# Patient Record
Sex: MALE | Race: WHITE | NOT HISPANIC OR LATINO
[De-identification: names, ages, dates, MRNs, and addresses within clinical notes are randomized per-mention and may not be internally consistent; named-entity substitution may affect disease eponyms.]

---

## 2022-04-04 ENCOUNTER — APPOINTMENT (OUTPATIENT)
Dept: OPHTHALMOLOGY | Facility: CLINIC | Age: 22
End: 2022-04-04

## 2022-09-30 ENCOUNTER — APPOINTMENT (OUTPATIENT)
Dept: PHYSICAL MEDICINE AND REHAB | Facility: CLINIC | Age: 22
End: 2022-09-30

## 2022-09-30 VITALS
RESPIRATION RATE: 16 BRPM | HEART RATE: 99 BPM | OXYGEN SATURATION: 100 % | WEIGHT: 161 LBS | DIASTOLIC BLOOD PRESSURE: 88 MMHG | BODY MASS INDEX: 27.49 KG/M2 | SYSTOLIC BLOOD PRESSURE: 132 MMHG | HEIGHT: 64 IN | TEMPERATURE: 97.2 F

## 2022-09-30 DIAGNOSIS — T50.905A ABNORMAL WEIGHT GAIN: ICD-10-CM

## 2022-09-30 DIAGNOSIS — M62.89 OTHER SPECIFIED DISORDERS OF MUSCLE: ICD-10-CM

## 2022-09-30 DIAGNOSIS — G47.00 INSOMNIA, UNSPECIFIED: ICD-10-CM

## 2022-09-30 DIAGNOSIS — Z73.819 BEHAVIORAL INSOMNIA OF CHILDHOOD, UNSPECIFIED TYPE: ICD-10-CM

## 2022-09-30 DIAGNOSIS — Z78.9 OTHER SPECIFIED HEALTH STATUS: ICD-10-CM

## 2022-09-30 DIAGNOSIS — R63.5 ABNORMAL WEIGHT GAIN: ICD-10-CM

## 2022-09-30 PROCEDURE — 99214 OFFICE O/P EST MOD 30 MIN: CPT

## 2022-09-30 RX ORDER — BUPROPION HYDROCHLORIDE 150 MG/1
150 TABLET, EXTENDED RELEASE ORAL EVERY MORNING
Qty: 1 | Refills: 0 | Status: ACTIVE | COMMUNITY
Start: 2022-09-30

## 2022-09-30 RX ORDER — CLONIDINE HYDROCHLORIDE 0.1 MG/1
0.1 TABLET ORAL DAILY
Qty: 1 | Refills: 0 | Status: ACTIVE | COMMUNITY
Start: 2022-09-30

## 2022-09-30 RX ORDER — METFORMIN HYDROCHLORIDE 500 MG/1
500 TABLET, COATED ORAL
Refills: 0 | Status: ACTIVE | COMMUNITY

## 2022-09-30 RX ORDER — MIRTAZAPINE 7.5 MG/1
7.5 TABLET, FILM COATED ORAL
Qty: 1 | Refills: 0 | Status: ACTIVE | COMMUNITY
Start: 2022-09-30

## 2022-09-30 RX ORDER — MINOCYCLINE HYDROCHLORIDE 100 MG/1
100 TABLET ORAL
Refills: 0 | Status: ACTIVE | COMMUNITY

## 2022-09-30 RX ORDER — HYDROXYZINE HYDROCHLORIDE 50 MG/1
50 TABLET ORAL EVERY 8 HOURS
Qty: 60 | Refills: 0 | Status: ACTIVE | COMMUNITY

## 2022-09-30 RX ORDER — CLONIDINE 0.2 MG/D
0.2 PATCH TRANSDERMAL
Refills: 0 | Status: DISCONTINUED | COMMUNITY
End: 2022-09-30

## 2022-09-30 RX ORDER — ARIPIPRAZOLE 10 MG/1
10 TABLET ORAL DAILY
Refills: 0 | Status: ACTIVE | COMMUNITY

## 2022-09-30 RX ORDER — MIRTAZAPINE 15 MG/1
15 TABLET, FILM COATED ORAL
Qty: 90 | Refills: 0 | Status: ACTIVE | COMMUNITY

## 2022-09-30 RX ORDER — ESCITALOPRAM OXALATE 20 MG/1
20 TABLET ORAL DAILY
Qty: 1 | Refills: 0 | Status: ACTIVE | COMMUNITY
Start: 2022-09-30

## 2022-09-30 RX ORDER — LORAZEPAM 1 MG/1
1 TABLET ORAL
Refills: 0 | Status: ACTIVE | COMMUNITY

## 2022-09-30 RX ORDER — BUPROPION HYDROCHLORIDE 150 MG/1
150 TABLET, FILM COATED, EXTENDED RELEASE ORAL
Qty: 1 | Refills: 0 | Status: ACTIVE | COMMUNITY
Start: 2022-09-30

## 2022-09-30 RX ORDER — OMEPRAZOLE 40 MG/1
40 CAPSULE, DELAYED RELEASE ORAL
Qty: 90 | Refills: 0 | Status: DISCONTINUED | COMMUNITY
End: 2022-09-30

## 2022-09-30 RX ORDER — ESZOPICLONE 2 MG/1
2 TABLET, FILM COATED ORAL
Qty: 1 | Refills: 0 | Status: ACTIVE | COMMUNITY
Start: 2022-09-30

## 2022-09-30 RX ORDER — NEBIVOLOL 10 MG/1
10 TABLET ORAL DAILY
Qty: 1 | Refills: 0 | Status: ACTIVE | COMMUNITY
Start: 2022-09-30

## 2022-09-30 NOTE — HISTORY OF PRESENT ILLNESS
[FreeTextEntry8] : CC- cough for 1 week\par \par Patient presents today accompanied by his mother who assisted with history and HPI. He complaints of a productive cough that has been worsening for the past week. His mother notes it sounds like a croupy cough. He notes the symptoms are worse at night and that he is coughing in his sleep. He has an additional complaint a itching and redness in his pelvic region for the past week which he has been using Antigungal spray with some improvement. Due to his OCD he is only able to shower once per week. He denies any HA, fever, chills, N/V/D, abdominal pain, ear pain, sore throat, wheezing, cp, or sob.  Mother notes she has a sore throat but no other sick contacts. He tested for Covid two days ago which was negative. He has not been taking anything for the cough.

## 2022-09-30 NOTE — PHYSICAL EXAM
[No Acute Distress] : no acute distress [Well Nourished] : well nourished [Well Developed] : well developed [Well-Appearing] : well-appearing [Normal Sclera/Conjunctiva] : normal sclera/conjunctiva [PERRL] : pupils equal round and reactive to light [Normal Outer Ear/Nose] : the outer ears and nose were normal in appearance [Normal Nasal Mucosa] : the nasal mucosa was normal [No Respiratory Distress] : no respiratory distress  [No Accessory Muscle Use] : no accessory muscle use [Clear to Auscultation] : lungs were clear to auscultation bilaterally [Normal Rate] : normal rate  [Regular Rhythm] : with a regular rhythm [Normal S1, S2] : normal S1 and S2 [No Murmur] : no murmur heard [Urethral Meatus] : meatus normal [Penis Abnormality] : normal uncircumcised penis [Scrotum] : the scrotum was normal [Normal Anterior Cervical Nodes] : no anterior cervical lymphadenopathy [Speech Grossly Normal] : speech grossly normal [Memory Grossly Normal] : memory grossly normal [Normal Affect] : the affect was normal [Normal Insight/Judgement] : insight and judgment were intact [de-identified] : B/L Cerumen impaction, erythematous oropharynx  [FreeTextEntry1] : Mild erythema noted in b/l inner pelvic crease as well as underneath his penis and anterior aspect of his scrotum. No lesions noted and skin intact.

## 2022-09-30 NOTE — PLAN
[FreeTextEntry1] : Lungs CTA no wheezing noted. Will treat sx for excessive mucous with Mucinex DM. Discussed need for urgent f/u if sx worsen. If symptoms persist will obtain Cxray. Given night time coughing there is a concern for asthma, eliminate any possible allergens and therapy dog to be bathed regularly and not sleep in bed with patient if unnecessary. \par \par Patient unable to shower and will d/c use of OTC spray and start on Desenex powder to help keep area dry.\par \par Patient to continue with present medications.  \par Increase fluid intake.	 \par RTO in 7-10 days for re-eval or sooner if sx worsen. \par \par 30 minutes was spent with patient reviewing findings/results during this visit. Ample time was provided to answer any questions or address concerns to the patients satisfaction.. \par \par

## 2022-10-12 ENCOUNTER — APPOINTMENT (OUTPATIENT)
Dept: PHYSICAL MEDICINE AND REHAB | Facility: CLINIC | Age: 22
End: 2022-10-12

## 2022-10-12 VITALS
BODY MASS INDEX: 27.49 KG/M2 | SYSTOLIC BLOOD PRESSURE: 124 MMHG | DIASTOLIC BLOOD PRESSURE: 80 MMHG | WEIGHT: 161 LBS | RESPIRATION RATE: 16 BRPM | HEIGHT: 64 IN | HEART RATE: 124 BPM | OXYGEN SATURATION: 98 % | TEMPERATURE: 97.8 F

## 2022-10-12 DIAGNOSIS — J06.9 ACUTE UPPER RESPIRATORY INFECTION, UNSPECIFIED: ICD-10-CM

## 2022-10-12 DIAGNOSIS — R05.1 ACUTE COUGH: ICD-10-CM

## 2022-10-12 PROCEDURE — 99213 OFFICE O/P EST LOW 20 MIN: CPT

## 2022-10-12 RX ORDER — CETIRIZINE HYDROCHLORIDE 10 MG/1
10 TABLET, FILM COATED ORAL DAILY
Qty: 30 | Refills: 0 | Status: ACTIVE | COMMUNITY
Start: 2022-10-12 | End: 1900-01-01

## 2022-10-12 RX ORDER — FLUTICASONE PROPIONATE 50 UG/1
50 SPRAY, METERED NASAL TWICE DAILY
Qty: 1 | Refills: 0 | Status: ACTIVE | COMMUNITY
Start: 2022-10-12 | End: 1900-01-01

## 2022-10-12 RX ORDER — DEXTROAMPHETAMINE SULFATE, DEXTROAMPHETAMINE SACCHARATE, AMPHETAMINE SULFATE AND AMPHETAMINE ASPARTATE 5; 5; 5; 5 MG/1; MG/1; MG/1; MG/1
20 CAPSULE, EXTENDED RELEASE ORAL DAILY
Refills: 0 | Status: DISCONTINUED | COMMUNITY
End: 2022-10-12

## 2022-10-13 PROBLEM — J06.9 URI, ACUTE: Status: RESOLVED | Noted: 2022-10-13 | Resolved: 2022-11-12

## 2022-10-13 NOTE — ADDENDUM
[FreeTextEntry1] : 10-: \par Spoke with patient and MOC. Patient now has fever 101 and purulent productive cough. Denies any sob, weakness, cp, and is tolerating po intake. Patient unable to obtain Cxray at this time. Will tx with Zpack and patient to f/u in office next week.

## 2022-10-13 NOTE — PHYSICAL EXAM
[No Acute Distress] : no acute distress [Well Nourished] : well nourished [Well Developed] : well developed [Well-Appearing] : well-appearing [Normal Sclera/Conjunctiva] : normal sclera/conjunctiva [PERRL] : pupils equal round and reactive to light [No Respiratory Distress] : no respiratory distress  [No Accessory Muscle Use] : no accessory muscle use [Regular Rhythm] : with a regular rhythm [Normal S1, S2] : normal S1 and S2 [No Murmur] : no murmur heard [Speech Grossly Normal] : speech grossly normal [Memory Grossly Normal] : memory grossly normal [Normal Mood] : the mood was normal [Normal Insight/Judgement] : insight and judgment were intact [de-identified] : B/L cerumen impaction, unable to assess. Cobblestoning posterior oropharynx and erythamtous, normal tonsils, no exudate noted. Turbinates erythatmous and boggy with clear d/c noted.   [de-identified] : mild anterior cervical MARKELL [de-identified] : Tachycardic at baseline

## 2022-10-13 NOTE — PLAN
[FreeTextEntry1] : Start on Flonase and Zyrtex as prescribed for PND\par Patient to continue with present medications.  \par Increase fluid intake.	 \par RTO in 7-10 days for re-eval or sooner if sx worsen. \par \par 30 minutes was spent with patient reviewing findings/results during this visit. Ample time was provided to answer any questions or address concerns to the patients satisfaction.. \par \par

## 2022-10-13 NOTE — HISTORY OF PRESENT ILLNESS
[FreeTextEntry8] : CC- Cough for the past two weeks\par \par Patient presents with rhinorrhea and cough that has been persistent for the past two weeks and notes it to be slightly worse per patients mom. He notes the cough to be wet and unable to bring up sputum. He is unable to assess if he is having a fever or not due to his OCD and he denies any other complaints at this time to include cp, sob, weakness, n/v/d, and is tolerating po intake. Denies any sick contacts. He is only using mucinex at this time.

## 2022-10-21 ENCOUNTER — APPOINTMENT (OUTPATIENT)
Dept: PHYSICAL MEDICINE AND REHAB | Facility: CLINIC | Age: 22
End: 2022-10-21

## 2022-10-26 ENCOUNTER — APPOINTMENT (OUTPATIENT)
Dept: PHYSICAL MEDICINE AND REHAB | Facility: CLINIC | Age: 22
End: 2022-10-26

## 2022-10-26 VITALS
TEMPERATURE: 97.1 F | WEIGHT: 161 LBS | RESPIRATION RATE: 16 BRPM | SYSTOLIC BLOOD PRESSURE: 118 MMHG | OXYGEN SATURATION: 100 % | DIASTOLIC BLOOD PRESSURE: 82 MMHG | HEIGHT: 64 IN | HEART RATE: 115 BPM | BODY MASS INDEX: 27.49 KG/M2

## 2022-10-26 DIAGNOSIS — B35.6 TINEA CRURIS: ICD-10-CM

## 2022-10-26 DIAGNOSIS — R61 GENERALIZED HYPERHIDROSIS: ICD-10-CM

## 2022-10-26 PROCEDURE — 99214 OFFICE O/P EST MOD 30 MIN: CPT

## 2022-10-26 RX ORDER — BUPROPION HYDROCHLORIDE 300 MG/1
300 TABLET, EXTENDED RELEASE ORAL
Qty: 30 | Refills: 0 | Status: ACTIVE | COMMUNITY
Start: 2022-10-22

## 2022-10-26 RX ORDER — SODIUM CHLORIDE FOR INHALATION 0.9 %
0.9 VIAL, NEBULIZER (ML) INHALATION
Qty: 90 | Refills: 0 | Status: ACTIVE | COMMUNITY
Start: 2022-10-21

## 2022-10-26 NOTE — PHYSICAL EXAM
[No Acute Distress] : no acute distress [Well Nourished] : well nourished [Well Developed] : well developed [Well-Appearing] : well-appearing [No Respiratory Distress] : no respiratory distress  [No Accessory Muscle Use] : no accessory muscle use [Clear to Auscultation] : lungs were clear to auscultation bilaterally [Normal Rate] : normal rate  [Regular Rhythm] : with a regular rhythm [Normal S1, S2] : normal S1 and S2 [No Murmur] : no murmur heard [Speech Grossly Normal] : speech grossly normal [Memory Grossly Normal] : memory grossly normal [Normal Mood] : the mood was normal [Normal Insight/Judgement] : insight and judgment were intact [de-identified] : Erythematous rash in groin crease with no d/c or plaque noted. 1 mm pustule noted with hair on scrotum where patient complained of lump. Non tender. No other lumps or masses noted. Area appears non infected. Axillary region and forehead with diaphoresis noted.  [de-identified] : Flat affect

## 2022-10-26 NOTE — HISTORY OF PRESENT ILLNESS
[FreeTextEntry8] : CC- lump on penis and itchING\par \par Patient presents today with complaints of lump on the underside of his penis toward his scotum that was noticed a few days ago with continued itchy. Denies any redness or d/c. He has been using an alternate powder than the miconazole as recommended and patient notes it was not helping. He also complains of frequent diaphoresis without known cause for the past few months. He notes awaking in sweat. His recent labs returned with normal TSH and normal CBC. He has an appt with rheumatology to be evaluated for elevated RACQUEL. He denies any bone pain, weakness, sob, cp, fevers, chills, n/v/d, back pain, numbness/tingling. \par \par \par He provided a copy of his labs from his psychiatrist who was checking his labs to r/o other cause of fatigue beyond medications and his labs show elevated RACQUEL and significant hypertriglyceridemia in addition to elevated LDL. He continues to have fatigue after d/c his stimulants and using CPAP.

## 2022-10-26 NOTE — PLAN
[FreeTextEntry1] : Reviewed labs from [facility] \par \par Hypertriglyceridemia \par -Triglycerides 296 and \par -ALT 61\par - Start on fenofibrate once daily and reassess in 1 month to recheck Lipid and LFT's \par \par Elevated RACQUEL\par -1:320\par -CRP 9.7\par - F/U with rheumatology \par \par Hyperhidrosis \par - Unable to start Aluminum chloride powder as patients brother has issues with aluminum and mother prefers alternative medication\par - F/U with Derm evaluation and treatment \par \par Tinea Cruris\par - Keep area clean and dry\par - Start on Miconazole powder for 4-6 weeks \par -F/U with Derm if no improvement. \par \par Labs Drawn and sent to [facility]. \par Patient to continue with present medications.  \par Increase fluid intake.	 \par RTO in 7-10 days for re-eval or sooner if sx worsen. \par \par 30 minutes was spent with patient reviewing findings/results during this visit. Ample time was provided to answer any questions or address concerns to the patients satisfaction.. \par \par \par \par

## 2022-10-27 ENCOUNTER — FORM ENCOUNTER (OUTPATIENT)
Age: 22
End: 2022-10-27

## 2022-11-01 RX ORDER — MICONAZOLE NITRATE
2 POWDER (GRAM) MISCELLANEOUS
Qty: 60 | Refills: 0 | Status: ACTIVE | COMMUNITY
Start: 2022-11-01 | End: 1900-01-01

## 2022-11-01 RX ORDER — AZITHROMYCIN 250 MG/1
250 TABLET, FILM COATED ORAL
Qty: 1 | Refills: 0 | Status: COMPLETED | COMMUNITY
Start: 2022-10-13 | End: 2022-11-01

## 2022-11-04 RX ORDER — ANTI-FUNGAL POWDER MICONAZOLE NITRATE TALC FREE 1.42 G/71G
2 POWDER TOPICAL TWICE DAILY
Qty: 1 | Refills: 0 | Status: DISCONTINUED | COMMUNITY
Start: 2022-10-26 | End: 2022-11-04

## 2022-11-16 ENCOUNTER — FORM ENCOUNTER (OUTPATIENT)
Age: 22
End: 2022-11-16

## 2022-12-08 ENCOUNTER — APPOINTMENT (OUTPATIENT)
Dept: PHYSICAL MEDICINE AND REHAB | Facility: CLINIC | Age: 22
End: 2022-12-08

## 2023-01-01 ENCOUNTER — FORM ENCOUNTER (OUTPATIENT)
Age: 23
End: 2023-01-01

## 2023-01-18 ENCOUNTER — RX RENEWAL (OUTPATIENT)
Age: 23
End: 2023-01-18

## 2023-01-18 ENCOUNTER — NON-APPOINTMENT (OUTPATIENT)
Age: 23
End: 2023-01-18

## 2023-01-31 ENCOUNTER — APPOINTMENT (OUTPATIENT)
Dept: PHYSICAL MEDICINE AND REHAB | Facility: CLINIC | Age: 23
End: 2023-01-31

## 2023-03-22 RX ORDER — LANSOPRAZOLE 30 MG/1
30 TABLET, ORALLY DISINTEGRATING ORAL
Qty: 1 | Refills: 0 | Status: DISCONTINUED | COMMUNITY
Start: 2022-09-30 | End: 2023-03-22

## 2023-03-28 ENCOUNTER — FORM ENCOUNTER (OUTPATIENT)
Age: 23
End: 2023-03-28

## 2023-07-19 ENCOUNTER — OFFICE (OUTPATIENT)
Dept: URBAN - METROPOLITAN AREA CLINIC 115 | Facility: CLINIC | Age: 23
Setting detail: OPHTHALMOLOGY
End: 2023-07-19
Payer: COMMERCIAL

## 2023-07-19 DIAGNOSIS — H16.223: ICD-10-CM

## 2023-07-19 DIAGNOSIS — H16.221: ICD-10-CM

## 2023-07-19 DIAGNOSIS — H53.9: ICD-10-CM

## 2023-07-19 DIAGNOSIS — H16.222: ICD-10-CM

## 2023-07-19 DIAGNOSIS — H43.811: ICD-10-CM

## 2023-07-19 DIAGNOSIS — F95.9: ICD-10-CM

## 2023-07-19 DIAGNOSIS — H47.333: ICD-10-CM

## 2023-07-19 DIAGNOSIS — H53.433: ICD-10-CM

## 2023-07-19 PROCEDURE — 92133 CPTRZD OPH DX IMG PST SGM ON: CPT | Performed by: OPHTHALMOLOGY

## 2023-07-19 PROCEDURE — 92083 EXTENDED VISUAL FIELD XM: CPT | Performed by: OPHTHALMOLOGY

## 2023-07-19 PROCEDURE — 83861 MICROFLUID ANALY TEARS: CPT | Performed by: OPHTHALMOLOGY

## 2023-07-19 PROCEDURE — 99204 OFFICE O/P NEW MOD 45 MIN: CPT | Performed by: OPHTHALMOLOGY

## 2023-07-19 ASSESSMENT — TONOMETRY
OS_IOP_MMHG: 19
OD_IOP_MMHG: 19

## 2023-07-19 ASSESSMENT — SPHEQUIV_DERIVED
OS_SPHEQUIV: -1.125
OD_SPHEQUIV: -1.125

## 2023-07-19 ASSESSMENT — REFRACTION_CURRENTRX
OS_OVR_VA: 20/
OD_VPRISM_DIRECTION: SV
OD_OVR_VA: 20/
OS_VPRISM_DIRECTION: SV
OS_SPHERE: -0.50
OS_AXIS: 172
OD_SPHERE: -0.50
OS_CYLINDER: -1.00
OD_CYLINDER: +1.00
OD_AXIS: 001

## 2023-07-19 ASSESSMENT — CONFRONTATIONAL VISUAL FIELD TEST (CVF)
OS_FINDINGS: FULL
OD_FINDINGS: FULL

## 2023-07-19 ASSESSMENT — REFRACTION_AUTOREFRACTION
OS_SPHERE: -0.50
OS_CYLINDER: -1.25
OD_CYLINDER: -1.25
OD_AXIS: 003
OD_SPHERE: -0.50
OS_AXIS: 175

## 2023-07-19 ASSESSMENT — VISUAL ACUITY
OD_BCVA: 20/20-1
OS_BCVA: 20/25

## 2024-02-05 ENCOUNTER — APPOINTMENT (OUTPATIENT)
Dept: PHYSICAL MEDICINE AND REHAB | Facility: CLINIC | Age: 24
End: 2024-02-05
Payer: COMMERCIAL

## 2024-02-05 VITALS
SYSTOLIC BLOOD PRESSURE: 116 MMHG | DIASTOLIC BLOOD PRESSURE: 74 MMHG | TEMPERATURE: 97.4 F | OXYGEN SATURATION: 100 % | RESPIRATION RATE: 16 BRPM | HEART RATE: 81 BPM

## 2024-02-05 DIAGNOSIS — R07.89 OTHER CHEST PAIN: ICD-10-CM

## 2024-02-05 DIAGNOSIS — R00.0 TACHYCARDIA, UNSPECIFIED: ICD-10-CM

## 2024-02-05 DIAGNOSIS — R09.82 POSTNASAL DRIP: ICD-10-CM

## 2024-02-05 PROCEDURE — 99213 OFFICE O/P EST LOW 20 MIN: CPT

## 2024-02-05 NOTE — HISTORY OF PRESENT ILLNESS
[FreeTextEntry1] : HBP [de-identified] : Patient presents today for evaluation of HBP. Pt states his psychiatrist advised he follow up with his PMD after elevated BP readings over the last few weeks. Pt denies HA, CP, diff breathing, abd pain, NVD, fever, chills. No other complaints. show

## 2024-02-05 NOTE — PLAN
[FreeTextEntry1] :  Pt BP WNL. Advised pt to track sodium intake.   Discussed and reviewed current medications as follows;  Nebivolol-PRN CP, Fenofibrate- HLD, Bupropion, Escitalopram-anxiety Patient to continue with present medications - all medications reconciled/reviewed during this visit and listed above.   Increase fluid intake. RTO for annual wellness visit. At least 20 minutes was spent with patient face to face examining and reviewing findings/results during this visit. Ample time was provided to answer any questions or address concerns to the patients satisfaction.  I, Jessica Don, attest that this documentation has been prepared under the direction and in the presence of Provider Dayron Madera DNP The documentation recorded by the scribe, in my presence, accurately reflects the service I personally performed, and the decisions made by me with my edits as appropriate. Dayron Madera DNP

## 2024-03-11 RX ORDER — LANSOPRAZOLE 30 MG/1
30 CAPSULE, DELAYED RELEASE ORAL DAILY
Qty: 90 | Refills: 1 | Status: ACTIVE | COMMUNITY
Start: 2023-03-22 | End: 1900-01-01

## 2024-03-22 ENCOUNTER — LABORATORY RESULT (OUTPATIENT)
Age: 24
End: 2024-03-22

## 2024-03-22 ENCOUNTER — APPOINTMENT (OUTPATIENT)
Dept: PHYSICAL MEDICINE AND REHAB | Facility: CLINIC | Age: 24
End: 2024-03-22
Payer: COMMERCIAL

## 2024-03-22 VITALS
DIASTOLIC BLOOD PRESSURE: 70 MMHG | SYSTOLIC BLOOD PRESSURE: 116 MMHG | HEART RATE: 84 BPM | RESPIRATION RATE: 16 BRPM | HEIGHT: 64 IN | OXYGEN SATURATION: 99 % | BODY MASS INDEX: 31.07 KG/M2 | TEMPERATURE: 97.8 F | WEIGHT: 182 LBS

## 2024-03-22 DIAGNOSIS — K21.9 GASTRO-ESOPHAGEAL REFLUX DISEASE W/OUT ESOPHAGITIS: ICD-10-CM

## 2024-03-22 DIAGNOSIS — R76.8 OTHER SPECIFIED ABNORMAL IMMUNOLOGICAL FINDINGS IN SERUM: ICD-10-CM

## 2024-03-22 DIAGNOSIS — F90.9 ATTENTION-DEFICIT HYPERACTIVITY DISORDER, UNSPECIFIED TYPE: ICD-10-CM

## 2024-03-22 DIAGNOSIS — Z00.00 ENCOUNTER FOR GENERAL ADULT MEDICAL EXAMINATION W/OUT ABNORMAL FINDINGS: ICD-10-CM

## 2024-03-22 PROCEDURE — 99395 PREV VISIT EST AGE 18-39: CPT

## 2024-03-22 PROCEDURE — 36410 VNPNXR 3YR/> PHY/QHP DX/THER: CPT

## 2024-03-22 PROCEDURE — 81003 URINALYSIS AUTO W/O SCOPE: CPT | Mod: QW

## 2024-03-22 NOTE — PHYSICAL EXAM
[Well Nourished] : well nourished [No Acute Distress] : no acute distress [Well Developed] : well developed [Well-Appearing] : well-appearing [Normal Sclera/Conjunctiva] : normal sclera/conjunctiva [PERRL] : pupils equal round and reactive to light [EOMI] : extraocular movements intact [Normal Outer Ear/Nose] : the outer ears and nose were normal in appearance [Normal Oropharynx] : the oropharynx was normal [No Lymphadenopathy] : no lymphadenopathy [Supple] : supple [No JVD] : no jugular venous distention [No Respiratory Distress] : no respiratory distress  [Thyroid Normal, No Nodules] : the thyroid was normal and there were no nodules present [No Accessory Muscle Use] : no accessory muscle use [Clear to Auscultation] : lungs were clear to auscultation bilaterally [Normal Rate] : normal rate  [Regular Rhythm] : with a regular rhythm [No Murmur] : no murmur heard [Normal S1, S2] : normal S1 and S2 [No Carotid Bruits] : no carotid bruits [No Varicosities] : no varicosities [No Abdominal Bruit] : a ~M bruit was not heard ~T in the abdomen [No Edema] : there was no peripheral edema [Pedal Pulses Present] : the pedal pulses are present [No Extremity Clubbing/Cyanosis] : no extremity clubbing/cyanosis [No Palpable Aorta] : no palpable aorta [Soft] : abdomen soft [Non Tender] : non-tender [Non-distended] : non-distended [No HSM] : no HSM [No Masses] : no abdominal mass palpated [Normal Bowel Sounds] : normal bowel sounds [Normal Posterior Cervical Nodes] : no posterior cervical lymphadenopathy [No CVA Tenderness] : no CVA  tenderness [Normal Anterior Cervical Nodes] : no anterior cervical lymphadenopathy [No Spinal Tenderness] : no spinal tenderness [Grossly Normal Strength/Tone] : grossly normal strength/tone [No Joint Swelling] : no joint swelling [Coordination Grossly Intact] : coordination grossly intact [No Rash] : no rash [Normal Gait] : normal gait [No Focal Deficits] : no focal deficits [Deep Tendon Reflexes (DTR)] : deep tendon reflexes were 2+ and symmetric [Normal Affect] : the affect was normal [Normal Insight/Judgement] : insight and judgment were intact

## 2024-03-22 NOTE — PLAN
[FreeTextEntry1] :  Labs Drawn by Dr. Dayron Madera due to poor venous access.  Patient required lab testing due to conditions in their past medical history requiring periodic monitoring.  Labs were sent to [facility].  Discussed and reviewed medications with patient as follows; Nebivolol-PRN CP, Fenofibrate- HLD, Bupropion, Escitalopram-anxiety Patient to continue with present medications - all medications reconciled/reviewed during this visit and listed above.  Increase fluid intake.  RTO in 7-10 days for re-evaluation.  I, Jessica Don, attest that this documentation has been prepared under the direction and in the presence of Provider Dayron Madera DNP The documentation recorded by the scribe, in my presence, accurately reflects the service I personally performed, and the decisions made by me with my edits as appropriate. Dayron Madera DNP

## 2024-03-22 NOTE — HISTORY OF PRESENT ILLNESS
[FreeTextEntry1] : annual wellness visit  [de-identified] : Patient presents today for physical exam. His last PE was over a year ago and since that time he has not had any significant changes to his medical history. Denies any CP, SOB or diff breathing. Denies abdominal pain, blood in stool, or changes in bowel habits. No recent fever, chills, cough or cold type symptoms. Has no other complaints at this time.

## 2024-03-25 LAB
25(OH)D3 SERPL-MCNC: 115 NG/ML
ALBUMIN SERPL ELPH-MCNC: 5.2 G/DL
ALP BLD-CCNC: 64 U/L
ALT SERPL-CCNC: 228 U/L
ANION GAP SERPL CALC-SCNC: 16 MMOL/L
AST SERPL-CCNC: 102 U/L
B BURGDOR AB SER-IMP: NEGATIVE
B BURGDOR IGG+IGM SER QL: 0.06 INDEX
BASOPHILS # BLD AUTO: 0.04 K/UL
BASOPHILS NFR BLD AUTO: 0.5 %
BILIRUB SERPL-MCNC: 0.4 MG/DL
BUN SERPL-MCNC: 16 MG/DL
CALCIUM SERPL-MCNC: 10.8 MG/DL
CHLORIDE SERPL-SCNC: 98 MMOL/L
CHOLEST SERPL-MCNC: 207 MG/DL
CO2 SERPL-SCNC: 29 MMOL/L
CREAT SERPL-MCNC: 1.08 MG/DL
EGFR: 98 ML/MIN/1.73M2
EOSINOPHIL # BLD AUTO: 0.31 K/UL
EOSINOPHIL NFR BLD AUTO: 4.1 %
FERRITIN SERPL-MCNC: 502 NG/ML
FOLATE SERPL-MCNC: 15.8 NG/ML
GLUCOSE SERPL-MCNC: 104 MG/DL
HCT VFR BLD CALC: 52.8 %
HDLC SERPL-MCNC: 45 MG/DL
HGB BLD-MCNC: 17.3 G/DL
IMM GRANULOCYTES NFR BLD AUTO: 1.1 %
IRON SATN MFR SERPL: 24 %
IRON SERPL-MCNC: 89 UG/DL
LDLC SERPL CALC-MCNC: 131 MG/DL
LYMPHOCYTES # BLD AUTO: 1.84 K/UL
LYMPHOCYTES NFR BLD AUTO: 24.3 %
MAGNESIUM SERPL-MCNC: 2.1 MG/DL
MAN DIFF?: NORMAL
MCHC RBC-ENTMCNC: 31.2 PG
MCHC RBC-ENTMCNC: 32.8 GM/DL
MCV RBC AUTO: 95.1 FL
MONOCYTES # BLD AUTO: 0.75 K/UL
MONOCYTES NFR BLD AUTO: 9.9 %
NEUTROPHILS # BLD AUTO: 4.55 K/UL
NEUTROPHILS NFR BLD AUTO: 60.1 %
NONHDLC SERPL-MCNC: 162 MG/DL
PLATELET # BLD AUTO: 281 K/UL
POTASSIUM SERPL-SCNC: 4.6 MMOL/L
PROT SERPL-MCNC: 8 G/DL
RBC # BLD: 5.55 M/UL
RBC # FLD: 13.6 %
SODIUM SERPL-SCNC: 143 MMOL/L
T3 SERPL-MCNC: 183 NG/DL
T3RU NFR SERPL: 1.1 TBI
T4 FREE SERPL-MCNC: 1.4 NG/DL
TIBC SERPL-MCNC: 377 UG/DL
TRIGL SERPL-MCNC: 172 MG/DL
TSH SERPL-ACNC: 1.32 UIU/ML
UIBC SERPL-MCNC: 288 UG/DL
VIT B12 SERPL-MCNC: 1857 PG/ML
WBC # FLD AUTO: 7.57 K/UL

## 2024-04-02 ENCOUNTER — APPOINTMENT (OUTPATIENT)
Dept: PHYSICAL MEDICINE AND REHAB | Facility: CLINIC | Age: 24
End: 2024-04-02
Payer: COMMERCIAL

## 2024-04-02 VITALS — HEIGHT: 64 IN | WEIGHT: 182 LBS | BODY MASS INDEX: 31.07 KG/M2

## 2024-04-02 DIAGNOSIS — E78.1 PURE HYPERGLYCERIDEMIA: ICD-10-CM

## 2024-04-02 DIAGNOSIS — F41.9 ANXIETY DISORDER, UNSPECIFIED: ICD-10-CM

## 2024-04-02 DIAGNOSIS — R79.89 OTHER SPECIFIED ABNORMAL FINDINGS OF BLOOD CHEMISTRY: ICD-10-CM

## 2024-04-02 DIAGNOSIS — F43.10 POST-TRAUMATIC STRESS DISORDER, UNSPECIFIED: ICD-10-CM

## 2024-04-02 DIAGNOSIS — F95.2 TOURETTE'S DISORDER: ICD-10-CM

## 2024-04-02 PROCEDURE — 99214 OFFICE O/P EST MOD 30 MIN: CPT

## 2024-04-02 NOTE — PLAN
[FreeTextEntry1] : Labs reviewed with patient during this encounter.  Mixed lipids Abn LFT's Ferritin = 502 - Patient to donate blood as discussed.  Patient to continue with present medications - all medications reconciled/reviewed during this visit and listed above Patient to start Vitamin therapy as discussed during visit Increase fluid intake..  RTO for repeat labs in 3 months.  RTO in 3 months for re-evaluation.  Diet teaching for at least 8 minutes.performed in depth during this visit related to cholesterol and cardiovascular risks. At least 35 minutes was spent with patient via video conference reviewing findings/results during this visit. Ample time was provided to answer any questions or address concerns to the patients satisfaction..  Patient was advised that this audiovisual encounter constitutes a billable visit, and that the visit will be billed on the same terms and conditions as an in-office, face-to-face visit. Patient was further advised they may be responsible for any co-payment, co-insurance, or other self-payment they would normally pay for an office visit, unless such self-payment was waived by their insurance carrier. 		 At this time the patient is not willing to begin medication therapy for Cholesterol and wishes to modify diet in attempt to correct levels. The risk of elevated lipids were discussed at length during this visit and patient states understanding..

## 2024-04-02 NOTE — HISTORY OF PRESENT ILLNESS
[Medical Office: ([facility])___] : at the medical office located in  [Home] : at home, [unfilled] , at the time of the visit. [FreeTextEntry1] : Cholesterol [Verbal consent obtained from patient] : the patient, [unfilled] [de-identified] : Patient encounter today for re-evaluation of cholesterol.  States he has been doing well.  Denies any CP, SOB or diff breathing.  No recent fever, chills, cough or cold type symptoms.  Has no other complaints at this time.

## 2024-04-08 ENCOUNTER — TRANSCRIPTION ENCOUNTER (OUTPATIENT)
Age: 24
End: 2024-04-08

## 2024-04-21 RX ORDER — FENOFIBRATE 54 MG/1
54 TABLET ORAL
Qty: 90 | Refills: 1 | Status: ACTIVE | COMMUNITY
Start: 2022-10-26 | End: 1900-01-01

## 2025-09-22 PROBLEM — J06.9 URI (UPPER RESPIRATORY INFECTION): Status: ACTIVE | Noted: 2025-09-22 | Resolved: 2025-10-22

## 2025-09-22 PROBLEM — H61.23 BILATERAL IMPACTED CERUMEN: Status: ACTIVE | Noted: 2025-09-22
